# Patient Record
Sex: FEMALE | Race: WHITE
[De-identification: names, ages, dates, MRNs, and addresses within clinical notes are randomized per-mention and may not be internally consistent; named-entity substitution may affect disease eponyms.]

---

## 2017-08-02 ENCOUNTER — HOSPITAL ENCOUNTER (EMERGENCY)
Dept: HOSPITAL 47 - EC | Age: 32
Discharge: HOME | End: 2017-08-02
Payer: COMMERCIAL

## 2017-08-02 VITALS — TEMPERATURE: 97.4 F

## 2017-08-02 VITALS — HEART RATE: 87 BPM | SYSTOLIC BLOOD PRESSURE: 134 MMHG | DIASTOLIC BLOOD PRESSURE: 87 MMHG | RESPIRATION RATE: 16 BRPM

## 2017-08-02 DIAGNOSIS — M54.9: ICD-10-CM

## 2017-08-02 DIAGNOSIS — R10.31: Primary | ICD-10-CM

## 2017-08-02 DIAGNOSIS — Z91.018: ICD-10-CM

## 2017-08-02 LAB
ALP SERPL-CCNC: 93 U/L (ref 38–126)
ALT SERPL-CCNC: 52 U/L (ref 9–52)
ANION GAP SERPL CALC-SCNC: 12 MMOL/L
AST SERPL-CCNC: 32 U/L (ref 14–36)
BASOPHILS # BLD AUTO: 0 K/UL (ref 0–0.2)
BASOPHILS NFR BLD AUTO: 1 %
BUN SERPL-SCNC: 8 MG/DL (ref 7–17)
CALCIUM SPEC-MCNC: 9.1 MG/DL (ref 8.4–10.2)
CH: 30.8
CHCM: 34.3
CHLORIDE SERPL-SCNC: 103 MMOL/L (ref 98–107)
CO2 SERPL-SCNC: 24 MMOL/L (ref 22–30)
EOSINOPHIL # BLD AUTO: 0.1 K/UL (ref 0–0.7)
EOSINOPHIL NFR BLD AUTO: 2 %
ERYTHROCYTE [DISTWIDTH] IN BLOOD BY AUTOMATED COUNT: 4.37 M/UL (ref 3.8–5.4)
ERYTHROCYTE [DISTWIDTH] IN BLOOD: 14 % (ref 11.5–15.5)
GLUCOSE SERPL-MCNC: 95 MG/DL (ref 74–99)
HCT VFR BLD AUTO: 39.3 % (ref 34–46)
HDW: 2.66
HGB BLD-MCNC: 13.3 GM/DL (ref 11.4–16)
LUC NFR BLD AUTO: 1 %
LYMPHOCYTES # SPEC AUTO: 0.7 K/UL (ref 1–4.8)
LYMPHOCYTES NFR SPEC AUTO: 15 %
MCH RBC QN AUTO: 30.4 PG (ref 25–35)
MCHC RBC AUTO-ENTMCNC: 33.8 G/DL (ref 31–37)
MCV RBC AUTO: 90 FL (ref 80–100)
MONOCYTES # BLD AUTO: 0.2 K/UL (ref 0–1)
MONOCYTES NFR BLD AUTO: 4 %
NEUTROPHILS # BLD AUTO: 3.5 K/UL (ref 1.3–7.7)
NEUTROPHILS NFR BLD AUTO: 77 %
NON-AFRICAN AMERICAN GFR(MDRD): >60
PARTICLE COUNT: 5755
PH UR: 6 [PH] (ref 5–8)
POTASSIUM SERPL-SCNC: 4 MMOL/L (ref 3.5–5.1)
PROT SERPL-MCNC: 7.2 G/DL (ref 6.3–8.2)
RBC UR QL: 1 /HPF (ref 0–5)
SODIUM SERPL-SCNC: 139 MMOL/L (ref 137–145)
SP GR UR: 1.01 (ref 1–1.03)
SQUAMOUS UR QL AUTO: 7 /HPF (ref 0–4)
UA BILLING (MACRO VS. MICRO): (no result)
UROBILINOGEN UR QL STRIP: <2 MG/DL (ref ?–2)
WBC # BLD AUTO: 0.06 10*3/UL
WBC # BLD AUTO: 4.5 K/UL (ref 3.8–10.6)
WBC #/AREA URNS HPF: 3 /HPF (ref 0–5)
WBC (PEROX): 4.62

## 2017-08-02 PROCEDURE — 96376 TX/PRO/DX INJ SAME DRUG ADON: CPT

## 2017-08-02 PROCEDURE — 96372 THER/PROPH/DIAG INJ SC/IM: CPT

## 2017-08-02 PROCEDURE — 81001 URINALYSIS AUTO W/SCOPE: CPT

## 2017-08-02 PROCEDURE — 80053 COMPREHEN METABOLIC PANEL: CPT

## 2017-08-02 PROCEDURE — 86140 C-REACTIVE PROTEIN: CPT

## 2017-08-02 PROCEDURE — 74177 CT ABD & PELVIS W/CONTRAST: CPT

## 2017-08-02 PROCEDURE — 99284 EMERGENCY DEPT VISIT MOD MDM: CPT

## 2017-08-02 PROCEDURE — 85025 COMPLETE CBC W/AUTO DIFF WBC: CPT

## 2017-08-02 PROCEDURE — 36415 COLL VENOUS BLD VENIPUNCTURE: CPT

## 2017-08-02 PROCEDURE — 96374 THER/PROPH/DIAG INJ IV PUSH: CPT

## 2017-08-02 PROCEDURE — 81025 URINE PREGNANCY TEST: CPT

## 2017-08-02 NOTE — CT
EXAMINATION TYPE: CT abdomen pelvis w con

 

DATE OF EXAM: 8/2/2017

 

COMPARISON: NONE

 

HISTORY: Left flank pain x 6 days.

 

CT DLP: 4369.70 mGycm

Automated exposure control for dose reduction was used.

 

TECHNIQUE:  Helical acquisition of images was performed from the lung bases through the pelvis.

 

CONTRAST: 

Performed without Oral Contrast and with IV Contrast, patient injected with 100 mL of Omnipaque 300.

 

FINDINGS: 

 

Lung bases are clear. There is no pleural effusion. There is decreased density throughout the liver r
elated to fatty infiltration. Spleen appears normal. There is no pancreatic mass. Gallbladder appears
 normal.

 

There is no adrenal mass. Kidneys show satisfactory contrast opacification. There is no hydronephrosi
s. There is no retroperitoneal adenopathy. There is small umbilical hernia that contains fat. I see n
o intestinal wall thickening. There are no dilated loops. Bladder distends smoothly. There is probabl
y L4-5 bony spinal stenosis. There is no evidence of appendicitis. Appendix is not seen with certaint
y.

 

IMPRESSION: 

FATTY INFILTRATION OF THE LIVER.

 

NO EVIDENCE OF RENAL STONE OR OBSTRUCTION.

 

L4-5 SPINAL STENOSIS.
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
## 2018-02-25 ENCOUNTER — HOSPITAL ENCOUNTER (EMERGENCY)
Dept: HOSPITAL 47 - EC | Age: 33
Discharge: HOME | End: 2018-02-25
Payer: COMMERCIAL

## 2018-02-25 VITALS — TEMPERATURE: 98 F | SYSTOLIC BLOOD PRESSURE: 140 MMHG | HEART RATE: 78 BPM | DIASTOLIC BLOOD PRESSURE: 70 MMHG

## 2018-02-25 VITALS — RESPIRATION RATE: 18 BRPM

## 2018-02-25 DIAGNOSIS — S00.81XA: ICD-10-CM

## 2018-02-25 DIAGNOSIS — Z79.3: ICD-10-CM
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
## 2018-02-25 DIAGNOSIS — I10: ICD-10-CM

## 2018-02-25 DIAGNOSIS — Z91.02: ICD-10-CM

## 2018-02-25 DIAGNOSIS — S00.83XA: ICD-10-CM

## 2018-02-25 DIAGNOSIS — S39.012A: Primary | ICD-10-CM

## 2018-02-25 DIAGNOSIS — S00.03XA: ICD-10-CM

## 2018-02-25 DIAGNOSIS — S16.1XXA: ICD-10-CM

## 2018-02-25 DIAGNOSIS — Z79.899: ICD-10-CM

## 2018-02-25 PROCEDURE — 70486 CT MAXILLOFACIAL W/O DYE: CPT

## 2018-02-25 PROCEDURE — 72125 CT NECK SPINE W/O DYE: CPT

## 2018-02-25 PROCEDURE — 96372 THER/PROPH/DIAG INJ SC/IM: CPT

## 2018-02-25 PROCEDURE — 70450 CT HEAD/BRAIN W/O DYE: CPT

## 2018-02-25 PROCEDURE — 99284 EMERGENCY DEPT VISIT MOD MDM: CPT
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
## 2019-07-15 ENCOUNTER — HOSPITAL ENCOUNTER (OUTPATIENT)
Dept: HOSPITAL 47 - RADXRMAIN | Age: 34
Discharge: HOME | End: 2019-07-15
Payer: COMMERCIAL

## 2019-07-15 DIAGNOSIS — M77.31: Primary | ICD-10-CM

## 2019-07-15 NOTE — XR
Right foot and right ankle

 

HISTORY: Right heel pain for 1 month

 

3 views of the right ankle and 3 views of the right foot are submitted.

 

No comparisons

 

Bone mineralization, joint spaces and alignment are maintained. Mild spurring at the tibiotalar joint
. There is a plantar calcaneal spur present. No fracture or dislocation.

 

IMPRESSION: Plantar calcaneal spur.